# Patient Record
Sex: MALE | NOT HISPANIC OR LATINO | Employment: FULL TIME | URBAN - METROPOLITAN AREA
[De-identification: names, ages, dates, MRNs, and addresses within clinical notes are randomized per-mention and may not be internally consistent; named-entity substitution may affect disease eponyms.]

---

## 2022-11-28 ENCOUNTER — APPOINTMENT (OUTPATIENT)
Dept: ULTRASOUND IMAGING | Facility: CLINIC | Age: 57
End: 2022-11-28
Attending: EMERGENCY MEDICINE
Payer: COMMERCIAL

## 2022-11-28 LAB
ANION GAP SERPL CALCULATED.3IONS-SCNC: 6 MMOL/L (ref 3–14)
BASOPHILS # BLD AUTO: 0 10E3/UL (ref 0–0.2)
BASOPHILS NFR BLD AUTO: 0 %
BUN SERPL-MCNC: 23 MG/DL (ref 7–30)
CALCIUM SERPL-MCNC: 10.1 MG/DL (ref 8.5–10.1)
CHLORIDE BLD-SCNC: 102 MMOL/L (ref 94–109)
CO2 SERPL-SCNC: 28 MMOL/L (ref 20–32)
CREAT SERPL-MCNC: 1.12 MG/DL (ref 0.66–1.25)
CRP SERPL-MCNC: 16.6 MG/L (ref 0–8)
D DIMER PPP FEU-MCNC: 1.82 UG/ML FEU (ref 0–0.5)
EOSINOPHIL # BLD AUTO: 0.1 10E3/UL (ref 0–0.7)
EOSINOPHIL NFR BLD AUTO: 2 %
ERYTHROCYTE [DISTWIDTH] IN BLOOD BY AUTOMATED COUNT: 12.6 % (ref 10–15)
ERYTHROCYTE [SEDIMENTATION RATE] IN BLOOD BY WESTERGREN METHOD: 30 MM/HR (ref 0–20)
GFR SERPL CREATININE-BSD FRML MDRD: 77 ML/MIN/1.73M2
GLUCOSE BLD-MCNC: 140 MG/DL (ref 70–99)
HCT VFR BLD AUTO: 39.5 % (ref 40–53)
HGB BLD-MCNC: 13.8 G/DL (ref 13.3–17.7)
IMM GRANULOCYTES # BLD: 0 10E3/UL
IMM GRANULOCYTES NFR BLD: 1 %
LYMPHOCYTES # BLD AUTO: 1.8 10E3/UL (ref 0.8–5.3)
LYMPHOCYTES NFR BLD AUTO: 24 %
MCH RBC QN AUTO: 30.3 PG (ref 26.5–33)
MCHC RBC AUTO-ENTMCNC: 34.9 G/DL (ref 31.5–36.5)
MCV RBC AUTO: 87 FL (ref 78–100)
MONOCYTES # BLD AUTO: 0.5 10E3/UL (ref 0–1.3)
MONOCYTES NFR BLD AUTO: 7 %
NEUTROPHILS # BLD AUTO: 5.2 10E3/UL (ref 1.6–8.3)
NEUTROPHILS NFR BLD AUTO: 66 %
NRBC # BLD AUTO: 0 10E3/UL
NRBC BLD AUTO-RTO: 0 /100
PLATELET # BLD AUTO: 382 10E3/UL (ref 150–450)
POTASSIUM BLD-SCNC: 3.8 MMOL/L (ref 3.4–5.3)
RBC # BLD AUTO: 4.56 10E6/UL (ref 4.4–5.9)
SODIUM SERPL-SCNC: 136 MMOL/L (ref 133–144)
WBC # BLD AUTO: 7.8 10E3/UL (ref 4–11)

## 2022-11-28 PROCEDURE — 36415 COLL VENOUS BLD VENIPUNCTURE: CPT | Performed by: EMERGENCY MEDICINE

## 2022-11-28 PROCEDURE — 85004 AUTOMATED DIFF WBC COUNT: CPT | Performed by: EMERGENCY MEDICINE

## 2022-11-28 PROCEDURE — 99284 EMERGENCY DEPT VISIT MOD MDM: CPT | Mod: 25

## 2022-11-28 PROCEDURE — 93971 EXTREMITY STUDY: CPT | Mod: LT

## 2022-11-28 PROCEDURE — 82310 ASSAY OF CALCIUM: CPT | Performed by: EMERGENCY MEDICINE

## 2022-11-28 PROCEDURE — 85652 RBC SED RATE AUTOMATED: CPT | Performed by: EMERGENCY MEDICINE

## 2022-11-28 PROCEDURE — 85379 FIBRIN DEGRADATION QUANT: CPT | Performed by: EMERGENCY MEDICINE

## 2022-11-28 PROCEDURE — 86140 C-REACTIVE PROTEIN: CPT | Performed by: EMERGENCY MEDICINE

## 2022-11-28 NOTE — ED TRIAGE NOTES
Patient complaints of left leg pain on 20th.  Covid on the 16th.  No chest pain or SOB.  Left leg feels like it is sunburned & is discolored.  Burning sensation to leg.  Ankle feels like it is locked.  Pain from below knee to ankle foot.  Trouble bearing weight.

## 2022-11-28 NOTE — ED NOTES
"PIT/Triage Evaluation    Patient presented with leg pain for the past week and a half. He says that he had COVID on 11/16 that was pretty bad, and then noticed some discoloration and swelling in his left leg. The patient reports that the reason he presented today is because he is completely unable to put weight on it anymore. Denies abdominal pain, chest pain, fever, shortness of breath, cough.    Exam is notable for   BP (!) 148/81   Pulse 71   Temp 98.2  F (36.8  C) (Oral)   Resp 14   Ht 1.778 m (5' 10\")   Wt 109.3 kg (241 lb)   SpO2 100%   BMI 34.58 kg/m    General: Resting comfortably on the gurney  Head:  The scalp, face, and head appear normal  Eyes:  The pupils are normal    Conjunctivae and sclera appear normal  ENT:    The nose is normal    Ears/pinnae are normal  Neck:  Normal range of motion  MS:  There is notable swelling and discoloration to the distal left lower extremity.  There is some discoloration to the fourth and fifth toes of the left foot.  Distal CMS of the left lower extremity intact.  Mild tenderness to compression of the thigh although limited evaluation given in triage evaluation.  No posterior left knee discomfort.  Skin:  There is purple/reddish discoloration to the distal left lower extremity/lower leg.  Distal CMS intact.  No skin wounds  Neuro: Speech is normal and fluent  Psych:  Awake. Alert.  Normal affect.      Appropriate interactions    Appropriate interventions for symptom management were initiated if applicable.  Appropriate diagnostic tests were initiated if indicated.    Important information for subsequent clinician:  Patient is a 57-year-old male approximately 2-1/2 weeks post who presents with left lower extremity swelling and discomfort.  Concern for DVT  versus vasculitis versus Baker's cyst.  Ultrasound in process.  D-dimer obtained to help facilitate whether patient would need a repeat ultrasound in 1 week if elevated.  If D-dimer is undetectable or negative " would not need a repeat ultrasound.  Certainly if the ultrasound is positive for DVT he would not need a repeat ultrasound as well.  Blood work in process as well as ultrasound ordered from triage.    I briefly evaluated the patient and developed an initial plan of care. I discussed this plan and explained that this brief interaction does not constitute a full evaluation. Patient/family understands that they should wait to be fully evaluated and discuss any test results with another clinician prior to leaving the hospital.    Scribe Disclosure:  I, Scotty Mohan, am serving as a scribe at 4:49 PM on 11/28/2022 to document services personally performed by Salvador Suarez MD based on my observations and the provider's statements to me.          Salvador Suarez MD  11/28/22 0152

## 2022-11-29 ENCOUNTER — HOSPITAL ENCOUNTER (EMERGENCY)
Facility: CLINIC | Age: 57
Discharge: HOME | End: 2022-11-29
Attending: EMERGENCY MEDICINE | Admitting: EMERGENCY MEDICINE
Payer: COMMERCIAL

## 2022-11-29 VITALS
TEMPERATURE: 98.2 F | SYSTOLIC BLOOD PRESSURE: 165 MMHG | DIASTOLIC BLOOD PRESSURE: 81 MMHG | BODY MASS INDEX: 34.5 KG/M2 | WEIGHT: 241 LBS | OXYGEN SATURATION: 97 % | HEART RATE: 71 BPM | RESPIRATION RATE: 14 BRPM | HEIGHT: 70 IN

## 2022-11-29 DIAGNOSIS — L03.116 CELLULITIS OF LEFT LOWER EXTREMITY: ICD-10-CM

## 2022-11-29 PROCEDURE — 250N000013 HC RX MED GY IP 250 OP 250 PS 637: Performed by: EMERGENCY MEDICINE

## 2022-11-29 RX ORDER — CEPHALEXIN 500 MG/1
500 CAPSULE ORAL ONCE
Status: COMPLETED | OUTPATIENT
Start: 2022-11-29 | End: 2022-11-29

## 2022-11-29 RX ORDER — CEPHALEXIN 500 MG/1
500 CAPSULE ORAL 4 TIMES DAILY
Qty: 40 CAPSULE | Refills: 0 | Status: SHIPPED | OUTPATIENT
Start: 2022-11-29 | End: 2022-12-09

## 2022-11-29 RX ADMIN — CEPHALEXIN 500 MG: 500 CAPSULE ORAL at 06:52

## 2022-11-29 ASSESSMENT — ENCOUNTER SYMPTOMS
DIZZINESS: 0
FEVER: 0
LIGHT-HEADEDNESS: 0
COLOR CHANGE: 1
SHORTNESS OF BREATH: 0
MYALGIAS: 1

## 2022-11-29 ASSESSMENT — ACTIVITIES OF DAILY LIVING (ADL): ADLS_ACUITY_SCORE: 35

## 2022-11-29 NOTE — ED PROVIDER NOTES
"  History   Chief Complaint:  Leg Pain     The history is provided by the patient and medical records.      Salbador Bauer is a 57 year old male with history of type 2 diabetes mellitus, hypertension, and hyperlipidemia who presents with left leg pain. The patient states he had onset of left lower leg pain and swelling approximately 9 days ago. He endorses pain from his left knee down to his ankle. He endorses stiffness to his left ankle and mild discoloration to his lower leg as well. He denies any recorded fevers. He denies known direct trauma or injury to his leg. He denies chest pain, shortness of breath, lightheadedness, or dizziness. He notes he tested positive for COVID on 11/16 and had associated symptoms of myalgias and nausea for a few days. These symptoms have now resolved. He denies any fevers, cough, sore throat, or headaches during that time. He denies tobacco use.      Review of Systems   Constitutional: Negative for fever.   Respiratory: Negative for shortness of breath.    Cardiovascular: Negative for chest pain.   Musculoskeletal: Positive for myalgias (LLE).   Skin: Positive for color change.   Neurological: Negative for dizziness and light-headedness.   All other systems reviewed and are negative.    Allergies:  Aspirin    Medications:  Amlodipine   Lipitor   Hydrodiuril   Lisinopril  Metformin  Metoprolol     Past Medical History:     Diabetes mellitus, type 2  Hypertension  Hyperlipidemia  Basal cell carcinoma of face      Family History:    Mother: Heart Failure, Hypertension     Social History:  The patient was unaccompanied to the emergency department.  PCP: Claudia Jacobson     Physical Exam     Patient Vitals for the past 24 hrs:   BP Temp Temp src Pulse Resp SpO2 Height Weight   11/29/22 0630 (!) 165/81 -- -- 71 -- 97 % -- --   11/28/22 1644 (!) 148/81 98.2  F (36.8  C) Oral 71 14 100 % 1.778 m (5' 10\") 109.3 kg (241 lb)     Physical Exam  Nursing note and vitals " reviewed.  Constitutional:  Oriented to person, place, and time. Cooperative.   HENT:   Nose:    Nose normal.   Mouth/Throat:   Face mask in place.   Eyes:    Conjunctivae normal and EOM are normal.      Pupils are equal, round, and reactive to light.   Neck:    Trachea normal.   Cardiovascular:  Normal rate, regular rhythm, normal heart sounds and normal pulses. No murmur heard.  Pulmonary/Chest:  Effort normal and breath sounds normal.   Abdominal:   Soft. Normal appearance and bowel sounds are normal.      There is no tenderness.      There is no rebound and no CVA tenderness.   Musculoskeletal:  Extremities atraumatic x 4.      Swelling, warmth, and some dark erythema to the left lower leg.   Lymphadenopathy:  No cervical adenopathy.   Neurological:   Alert and oriented to person, place, and time. Normal strength.      No cranial nerve deficit or sensory deficit. GCS eye subscore is 4. GCS verbal subscore is 5. GCS motor subscore is 6.   Skin:    Skin is intact. No rash noted.   Psychiatric:   Normal mood and affect.    Emergency Department Course   Imaging:  US Lower Extremity Venous Duplex Left   Final Result   IMPRESSION:   1.  No deep venous thrombosis in the left lower extremity.      Report per radiology    Laboratory:  Labs Ordered and Resulted from Time of ED Arrival to Time of ED Departure   D DIMER QUANTITATIVE - Abnormal       Result Value    D-Dimer Quantitative 1.82 (*)    BASIC METABOLIC PANEL - Abnormal    Sodium 136      Potassium 3.8      Chloride 102      Carbon Dioxide (CO2) 28      Anion Gap 6      Urea Nitrogen 23      Creatinine 1.12      Calcium 10.1      Glucose 140 (*)     GFR Estimate 77     ERYTHROCYTE SEDIMENTATION RATE AUTO - Abnormal    Erythrocyte Sedimentation Rate 30 (*)    CRP INFLAMMATION - Abnormal    CRP Inflammation 16.6 (*)    CBC WITH PLATELETS AND DIFFERENTIAL - Abnormal    WBC Count 7.8      RBC Count 4.56      Hemoglobin 13.8      Hematocrit 39.5 (*)     MCV 87      MCH  30.3      MCHC 34.9      RDW 12.6      Platelet Count 382      % Neutrophils 66      % Lymphocytes 24      % Monocytes 7      % Eosinophils 2      % Basophils 0      % Immature Granulocytes 1      NRBCs per 100 WBC 0      Absolute Neutrophils 5.2      Absolute Lymphocytes 1.8      Absolute Monocytes 0.5      Absolute Eosinophils 0.1      Absolute Basophils 0.0      Absolute Immature Granulocytes 0.0      Absolute NRBCs 0.0        Emergency Department Course:     Reviewed:  I reviewed nursing notes, vitals, past medical history and Care Everywhere    Assessments:  0634 I obtained history and examined the patient as noted above.   Labs and imaging were ordered by my colleague during rapid assessment and I discussed results during this time.     Interventions:  0652 Keflex 500 mg PO    Disposition:  The patient was discharged to home.     Impression & Plan   Medical Decision Making:  This is a 57-year-old male came in for further evaluation of left lower leg swelling and pain, as well as some dark erythema present.  He does not appear septic or toxic, and he has no other associated symptoms.  He had the above work-up obtained prior to my arrival, including blood work and an ultrasound.  While his ultrasound is negative for DVT, I am still concerned he could have a DVT with a false negative ultrasound.  Therefore I recommended very close outpatient follow-up and a repeat ultrasound in 5 to 7 days if his symptoms do not resolve.  I am going to treat him with antibiotics for a presumed cellulitis as well.  I recommended following up in his clinic next week as previously scheduled and certainly returning here with any concerns or worsening symptoms.    Diagnosis:    ICD-10-CM    1. Cellulitis of left lower extremity  L03.116         Discharge Medications:  New Prescriptions    CEPHALEXIN (KEFLEX) 500 MG CAPSULE    Take 1 capsule (500 mg) by mouth 4 times daily for 10 days     Scribe Disclosure:  I, Deborah Donovan, am serving  as a scribe at 6:31 AM on 11/29/2022 to document services personally performed by Baldemar Messina MD based on my observations and the provider's statements to me.      Baldemar Messina MD  11/29/22 1028

## 2022-12-16 ENCOUNTER — ANCILLARY PROCEDURE (OUTPATIENT)
Dept: GENERAL RADIOLOGY | Facility: CLINIC | Age: 57
End: 2022-12-16
Attending: NURSE PRACTITIONER
Payer: COMMERCIAL

## 2022-12-16 ENCOUNTER — ANCILLARY PROCEDURE (OUTPATIENT)
Dept: ULTRASOUND IMAGING | Facility: CLINIC | Age: 57
End: 2022-12-16
Attending: NURSE PRACTITIONER
Payer: COMMERCIAL

## 2022-12-16 DIAGNOSIS — M79.662 PAIN AND SWELLING OF LEFT LOWER LEG: ICD-10-CM

## 2022-12-16 DIAGNOSIS — L03.116 CELLULITIS OF LEFT LOWER LEG: ICD-10-CM

## 2022-12-16 DIAGNOSIS — M79.89 PAIN AND SWELLING OF LEFT LOWER LEG: ICD-10-CM

## 2022-12-16 PROCEDURE — 73610 X-RAY EXAM OF ANKLE: CPT | Mod: LT

## 2022-12-16 PROCEDURE — 93971 EXTREMITY STUDY: CPT | Mod: LT
